# Patient Record
Sex: MALE | Race: WHITE | NOT HISPANIC OR LATINO | Employment: OTHER | URBAN - METROPOLITAN AREA
[De-identification: names, ages, dates, MRNs, and addresses within clinical notes are randomized per-mention and may not be internally consistent; named-entity substitution may affect disease eponyms.]

---

## 2024-09-07 ENCOUNTER — HOSPITAL ENCOUNTER (EMERGENCY)
Facility: HOSPITAL | Age: 54
Discharge: HOME | End: 2024-09-07

## 2024-09-07 VITALS
HEART RATE: 80 BPM | RESPIRATION RATE: 16 BRPM | TEMPERATURE: 97.9 F | SYSTOLIC BLOOD PRESSURE: 153 MMHG | WEIGHT: 194 LBS | DIASTOLIC BLOOD PRESSURE: 104 MMHG | HEIGHT: 74 IN | OXYGEN SATURATION: 99 % | BODY MASS INDEX: 24.9 KG/M2

## 2024-09-07 DIAGNOSIS — S61.411A LACERATION OF RIGHT HAND WITHOUT FOREIGN BODY, INITIAL ENCOUNTER: Primary | ICD-10-CM

## 2024-09-07 PROCEDURE — 90715 TDAP VACCINE 7 YRS/> IM: CPT | Performed by: PHYSICIAN ASSISTANT

## 2024-09-07 PROCEDURE — 2500000005 HC RX 250 GENERAL PHARMACY W/O HCPCS: Performed by: PHYSICIAN ASSISTANT

## 2024-09-07 PROCEDURE — 90471 IMMUNIZATION ADMIN: CPT | Performed by: PHYSICIAN ASSISTANT

## 2024-09-07 PROCEDURE — 12041 INTMD RPR N-HF/GENIT 2.5CM/<: CPT

## 2024-09-07 PROCEDURE — 12001 RPR S/N/AX/GEN/TRNK 2.5CM/<: CPT | Performed by: PHYSICIAN ASSISTANT

## 2024-09-07 PROCEDURE — 99283 EMERGENCY DEPT VISIT LOW MDM: CPT | Mod: 25

## 2024-09-07 PROCEDURE — 2500000004 HC RX 250 GENERAL PHARMACY W/ HCPCS (ALT 636 FOR OP/ED): Performed by: PHYSICIAN ASSISTANT

## 2024-09-07 RX ORDER — LIDOCAINE HYDROCHLORIDE 10 MG/ML
5 INJECTION INFILTRATION; PERINEURAL ONCE
Status: COMPLETED | OUTPATIENT
Start: 2024-09-07 | End: 2024-09-07

## 2024-09-07 RX ADMIN — TETANUS TOXOID, REDUCED DIPHTHERIA TOXOID AND ACELLULAR PERTUSSIS VACCINE, ADSORBED 0.5 ML: 5; 2.5; 8; 8; 2.5 SUSPENSION INTRAMUSCULAR at 13:45

## 2024-09-07 RX ADMIN — LIDOCAINE HYDROCHLORIDE 5 ML: 10 INJECTION, SOLUTION INFILTRATION; PERINEURAL at 13:45

## 2024-09-07 ASSESSMENT — COLUMBIA-SUICIDE SEVERITY RATING SCALE - C-SSRS
2. HAVE YOU ACTUALLY HAD ANY THOUGHTS OF KILLING YOURSELF?: NO
1. IN THE PAST MONTH, HAVE YOU WISHED YOU WERE DEAD OR WISHED YOU COULD GO TO SLEEP AND NOT WAKE UP?: NO
6. HAVE YOU EVER DONE ANYTHING, STARTED TO DO ANYTHING, OR PREPARED TO DO ANYTHING TO END YOUR LIFE?: NO

## 2024-09-07 ASSESSMENT — PAIN - FUNCTIONAL ASSESSMENT: PAIN_FUNCTIONAL_ASSESSMENT: 0-10

## 2024-09-07 ASSESSMENT — PAIN SCALES - GENERAL: PAINLEVEL_OUTOF10: 0 - NO PAIN

## 2024-09-07 NOTE — ED PROVIDER NOTES
HPI   Chief Complaint   Patient presents with    Laceration     Right hand laceration from installing toilet       This is a 54-year-old male presenting to the emergency department complaints of laceration to his right dorsum of his hand.  Patient was installing a toilet today when part of the toilet broke cutting his right hand.  Patient states this happened about 20 minutes prior to arrival.  He denies any loss sensation in his right hand, there is no pain in his digits of the right hand.  He does not know if his tetanus booster is up-to-date or not.  No other reported injuries.        Please see HPI for pertinent positive and negative ROS.      Patient History   No past medical history on file.  No past surgical history on file.  No family history on file.  Social History     Tobacco Use    Smoking status: Not on file    Smokeless tobacco: Not on file   Substance Use Topics    Alcohol use: Not on file    Drug use: Not on file       Physical Exam   ED Triage Vitals [09/07/24 1327]   Temperature Heart Rate Respirations BP   36.6 °C (97.9 °F) 80 16 (!) 153/104      Pulse Ox Temp Source Heart Rate Source Patient Position   99 % Oral Monitor Sitting      BP Location FiO2 (%)     Right arm --       Physical Exam  GENERAL APPEARANCE: This patient is in no acute respiratory distress. Awake and alert, talking appropriately. Answering questions appropriately. No evidence of pressured speech  VITAL SIGNS: As per the nurses' triage record.  HEENT: Normocephalic, atraumatic.  NECK:  full gross ROM during exam  MUSCULOSKELETAL:  Full gross active range of motion. Ambulating on own with no acute difficulties.  Wrist capillary refill of the right hand.  2+ radial pulse of right hand.  Sensation is intact over median, radial and ulnar nerve distributions of right hand.  Full active range of motion of all digits of right hand with 5 out of 5  strength.  No pain with palpation over the digits of the right hand, palmar or dorsal  aspect of right hand or the right wrist.  NEUROLOGICAL: Awake, alert and oriented x 3.  IMMUNOLOGICAL: No palpable lymphadenopathy or lymphatic streaking noted on visible skin.  DERM: No petechiae, rashes, or ecchymoses on visible skin.  There is a 2 cm crescent shaped laceration on the dorsum of the right hand beneath the fourth and fifth MCP.   PSYCH: mood and affect appear normal.      ED Course & MDM   Diagnoses as of 09/07/24 1437   Laceration of right hand without foreign body, initial encounter                 No data recorded     Jeffrey Coma Scale Score: 15 (09/07/24 1350 : Neda Guerrero RN)                           Medical Decision Making  Parts of this chart have been completed using voice recognition software. Please excuse any errors of transcription.  My thought process and reason for plan has been formulated from the time that I saw the patient until the time of disposition and is not specific to one specific moment during their visit and furthermore my MDM encompasses this entire chart and not only this text box.      HPI: Detailed above.    Exam: A medically appropriate exam performed, outlined above, given the known history and presentation.    History obtained from: Patient    Medications given during visit:  Medications   diphth,pertus(acell),tetanus (BoostRIX) 2.5-8-5 Lf-mcg-Lf/0.5mL vaccine 0.5 mL (0.5 mL intramuscular Given 9/7/24 1345)   lidocaine (Xylocaine) 10 mg/mL (1 %) injection 5 mL (5 mL intradermal Given 9/7/24 1345)        Diagnostic/tests  Labs Reviewed - No data to display   No orders to display        Considerations/further MDM:    Patient presents for crescent-shaped laceration over the dorsum of the right hand.  Please see procedure note for suture repair of the right hand.  Tetanus booster was updated in the emergency department.  Patient is neurovascular intact on presentation and after suture repair.  He was educated to have sutures removed in 7 to 10 days.  Educated to  keep the area clean and dry and covered while he is at work.  Educated on signs and symptoms of infection including spreading redness, warmth, or purulent drainage from the suture sites.  Laceration site presented clean on presentation, it is not over a joint space, therefore I will not start the patient on prophylactic antibiotics but he knows to look out for signs and symptoms of infection.  He felt comfortable with this plan.  The patient was discharged in stable condition.    Procedure  Laceration Repair    Performed by: Dayan Fernandez PA-C  Authorized by: Dayan Fernandez PA-C    Consent:     Consent obtained:  Verbal    Consent given by:  Patient    Risks discussed:  Poor cosmetic result, pain and infection  Universal protocol:     Patient identity confirmed:  Verbally with patient  Anesthesia:     Anesthesia method:  Local infiltration    Local anesthetic:  Lidocaine 1% w/o epi  Laceration details:     Location:  Hand    Hand location:  R hand, dorsum    Length (cm):  2  Exploration:     Contaminated: no    Treatment:     Area cleansed with:  Chlorhexidine and saline    Amount of cleaning:  Extensive    Irrigation solution:  Sterile saline    Irrigation method:  Tap  Skin repair:     Repair method:  Sutures    Suture size:  4-0    Suture material:  Nylon    Suture technique:  Simple interrupted    Number of sutures:  7  Approximation:     Approximation:  Close  Repair type:     Repair type:  Simple  Post-procedure details:     Dressing:  Antibiotic ointment and adhesive bandage    Procedure completion:  Tolerated       Dayan Fernandez PA-C  09/07/24 1759

## 2024-09-07 NOTE — DISCHARGE INSTRUCTIONS
You had 7 sutures placed.  I do recommend to have sutures removed in 7 to 10 days.  You may return to the emergency department, go to urgent care or your primary care doctor's office for suture removal.  Please monitor for signs of skin infection including spreading redness, warmth, or purulent drainage.  I did not start you on oral antibiotics today as your laceration was clean on presentation and you cut your hand on a clean material.  However, there is always a risk for cellulitis/skin infection if you break your skin.  Keep area clean and dry.  You may use bandage.  You may apply an antibiotic ointment over-the-counter.